# Patient Record
Sex: FEMALE | Race: WHITE | NOT HISPANIC OR LATINO | URBAN - METROPOLITAN AREA
[De-identification: names, ages, dates, MRNs, and addresses within clinical notes are randomized per-mention and may not be internally consistent; named-entity substitution may affect disease eponyms.]

---

## 2023-10-02 ENCOUNTER — MEDICAL CORRESPONDENCE (OUTPATIENT)
Dept: HEALTH INFORMATION MANAGEMENT | Facility: CLINIC | Age: 18
End: 2023-10-02
Payer: COMMERCIAL

## 2023-10-02 ENCOUNTER — TRANSFERRED RECORDS (OUTPATIENT)
Dept: HEALTH INFORMATION MANAGEMENT | Facility: CLINIC | Age: 18
End: 2023-10-02
Payer: COMMERCIAL

## 2023-10-04 ENCOUNTER — TRANSCRIBE ORDERS (OUTPATIENT)
Dept: OTHER | Age: 18
End: 2023-10-04

## 2023-10-04 DIAGNOSIS — H66.90 CHRONIC OTITIS MEDIA, UNSPECIFIED OTITIS MEDIA TYPE: Primary | ICD-10-CM

## 2024-01-08 ENCOUNTER — OFFICE VISIT (OUTPATIENT)
Dept: AUDIOLOGY | Facility: CLINIC | Age: 19
End: 2024-01-08
Payer: COMMERCIAL

## 2024-01-08 ENCOUNTER — OFFICE VISIT (OUTPATIENT)
Dept: OTOLARYNGOLOGY | Facility: CLINIC | Age: 19
End: 2024-01-08
Payer: COMMERCIAL

## 2024-01-08 DIAGNOSIS — B36.9 OTITIS EXTERNA, FUNGAL, BOTH EARS: ICD-10-CM

## 2024-01-08 DIAGNOSIS — H93.13 TINNITUS OF BOTH EARS: Primary | ICD-10-CM

## 2024-01-08 DIAGNOSIS — L29.9 EAR ITCHING: Primary | ICD-10-CM

## 2024-01-08 DIAGNOSIS — H62.43 OTITIS EXTERNA, FUNGAL, BOTH EARS: ICD-10-CM

## 2024-01-08 PROCEDURE — 92557 COMPREHENSIVE HEARING TEST: CPT | Performed by: AUDIOLOGIST

## 2024-01-08 PROCEDURE — 99203 OFFICE O/P NEW LOW 30 MIN: CPT | Performed by: OTOLARYNGOLOGY

## 2024-01-08 PROCEDURE — 92550 TYMPANOMETRY & REFLEX THRESH: CPT | Mod: 52 | Performed by: AUDIOLOGIST

## 2024-01-08 RX ORDER — LORATADINE 10 MG/1
10 TABLET, ORALLY DISINTEGRATING ORAL DAILY
COMMUNITY

## 2024-01-08 NOTE — LETTER
"    1/8/2024         RE: Paola Tyler  [address] [address]        Dear Colleague,    Thank you for referring your patient, Paola Tyler, to the [facility]. Please see a copy of my visit note below.    CHIEF COMPLAINT: Patient presents with:  Ear Problem: She states she she gets fungal and outer ear infections all started with swimmers ear, she will have itchiness and tenderness that comes and goes and there will be more build up at times and has had 2 cultures done in the past         HISTORY OF PRESENT ILLNESS    Paola was seen at the behest of Magda, Berhane EDMONDSON for ear concerns.  She has allergies which are controlled with claritin.  She has had discharge from her ear in the past with positive fungal culture.  Currently no ear pain or discharge.       AUDIOLOGY NOTE:    HX: Brown referred. Pt reports constant ear infections over the last 4-5 years. States she had \"swimmers ear\" 4-5 years ago and was given  drops. States ears were cultured Oct. 2023 and two years ago and both times were, \"positive for a fungus.\" Unsure if she has had OM in  the past. Currently both ears are painful and itchy. Notices mild drainage from the ears at times. Dizziness upon standing, denied vertigo.  Experiences brief episodes of non-pulsatile tinnitus in both ears. Family hx of age related hearing loss. She denied aural fullness, loud  noise exposure, and hx of ear surgery.  RESULTS: Otoscopy: Right: Minimal debris; Left: Clear canal. Tymps: Normal eardrum mobility bilat. Reflexes: Present for left ipsi, too  much artifact in other conditions to interpret. Audio: Normal hearing sensitivity bilat.  REC: Follow up with ENT as scheduled.       REVIEW OF SYSTEMS    Review of Systems as per HPI and PMHx, otherwise 10 system review system are negative.       ALLERGIES    Patient has no known allergies.    CURRENT MEDICATIONS      Current Outpatient Medications:      loratadine (CLARITIN " REDITABS) 10 MG ODT, Take 10 mg by mouth daily, Disp: , Rfl:      PAST MEDICAL HISTORY    PAST MEDICAL HISTORY: No past medical history on file.    PAST SURGICAL HISTORY    PAST SURGICAL HISTORY: No past surgical history on file.    FAMILY  HISTORY    FAMILY HISTORY: No family history on file.    SOCIAL HISTORY    SOCIAL HISTORY:   Social History     Tobacco Use     Smoking status: Never     Smokeless tobacco: Never   Substance Use Topics     Alcohol use: Not on file        PHYSICAL EXAM    HEAD: Normal appearance and symmetry:  No cutaneous lesions.      NECK:  supple     EARS:    Right:   TM intact with yellow soupy discharge (debrided);  GV applied   LEFT:   TM intact with yellow soupy discharge (debrided); GV applied        EYES:  EOMI    CN VII/XII:  intact     NOSE:     Dorsum:   straight  Septum:  midline  Mucosa:  moist        ORAL CAVITY/OROPHARYNX:     Lips:  Normal.  Tongue: normal, midline  Mucosa:   no lesions     NECK:  Trachea:  midline.              Thyroid:  normal              Adenopathy:  none        NEURO:   Alert and Oriented     GAIT AND STATION:  normal     RESPIRATORY:   Symmetry and Respiratory effort     PSYCH:  Normal mood and affect     SKIN:   warm and dry         IMPRESSION:    Encounter Diagnoses   Name Primary?     Ear itching Yes     Otitis externa, fungal, both ears           RECOMMENDATIONS:    Water precautions  Return visit 6 months  MyChart if still experiencing itching, will prescribe dermotic drops.         Again, thank you for allowing me to participate in the care of your patient.        Sincerely,        Bakari Reddy MD

## 2024-01-08 NOTE — PROGRESS NOTES
"CHIEF COMPLAINT: Patient presents with:  Ear Problem: She states she she gets fungal and outer ear infections all started with swimmers ear, she will have itchiness and tenderness that comes and goes and there will be more build up at times and has had 2 cultures done in the past         HISTORY OF PRESENT ILLNESS    Paola was seen at the behest of Magda, Berhane EDMONDSON for ear concerns.  She has allergies which are controlled with claritin.  She has had discharge from her ear in the past with positive fungal culture.  Currently no ear pain or discharge.       AUDIOLOGY NOTE:    HX: Brown referred. Pt reports constant ear infections over the last 4-5 years. States she had \"swimmers ear\" 4-5 years ago and was given  drops. States ears were cultured Oct. 2023 and two years ago and both times were, \"positive for a fungus.\" Unsure if she has had OM in  the past. Currently both ears are painful and itchy. Notices mild drainage from the ears at times. Dizziness upon standing, denied vertigo.  Experiences brief episodes of non-pulsatile tinnitus in both ears. Family hx of age related hearing loss. She denied aural fullness, loud  noise exposure, and hx of ear surgery.  RESULTS: Otoscopy: Right: Minimal debris; Left: Clear canal. Tymps: Normal eardrum mobility bilat. Reflexes: Present for left ipsi, too  much artifact in other conditions to interpret. Audio: Normal hearing sensitivity bilat.  REC: Follow up with ENT as scheduled.       REVIEW OF SYSTEMS    Review of Systems as per HPI and PMHx, otherwise 10 system review system are negative.       ALLERGIES    Patient has no known allergies.    CURRENT MEDICATIONS      Current Outpatient Medications:     loratadine (CLARITIN REDITABS) 10 MG ODT, Take 10 mg by mouth daily, Disp: , Rfl:      PAST MEDICAL HISTORY    PAST MEDICAL HISTORY: No past medical history on file.    PAST SURGICAL HISTORY    PAST SURGICAL HISTORY: No past surgical history on file.    FAMILY  " HISTORY    FAMILY HISTORY: No family history on file.    SOCIAL HISTORY    SOCIAL HISTORY:   Social History     Tobacco Use    Smoking status: Never    Smokeless tobacco: Never   Substance Use Topics    Alcohol use: Not on file        PHYSICAL EXAM    HEAD: Normal appearance and symmetry:  No cutaneous lesions.      NECK:  supple     EARS:    Right:   TM intact with yellow soupy discharge (debrided);  GV applied   LEFT:   TM intact with yellow soupy discharge (debrided); GV applied        EYES:  EOMI    CN VII/XII:  intact     NOSE:     Dorsum:   straight  Septum:  midline  Mucosa:  moist        ORAL CAVITY/OROPHARYNX:     Lips:  Normal.  Tongue: normal, midline  Mucosa:   no lesions     NECK:  Trachea:  midline.              Thyroid:  normal              Adenopathy:  none        NEURO:   Alert and Oriented     GAIT AND STATION:  normal     RESPIRATORY:   Symmetry and Respiratory effort     PSYCH:  Normal mood and affect     SKIN:   warm and dry         IMPRESSION:    Encounter Diagnoses   Name Primary?    Ear itching Yes    Otitis externa, fungal, both ears           RECOMMENDATIONS:    Water precautions  Return visit 6 months  MyChart if still experiencing itching, will prescribe dermotic drops.

## 2024-01-08 NOTE — PROGRESS NOTES
AUDIOLOGY REPORT    SUMMARY: Audiology visit completed. See audiogram for results.      RECOMMENDATIONS: Follow-up with ENT.    Sumeet Urrutia, CCC-A  [place] [facility] #[id number]

## 2024-09-28 ENCOUNTER — HEALTH MAINTENANCE LETTER (OUTPATIENT)
Age: 19
End: 2024-09-28